# Patient Record
(demographics unavailable — no encounter records)

---

## 2024-11-13 NOTE — REASON FOR VISIT
[Follow-Up: _____] : a [unfilled] follow-up visit [FreeTextEntry1] : Dos: 6/11/24; S/P: Bilateral subtotal subcutaneous mastectomy with nipple transposition on a pedicle.

## 2024-11-13 NOTE — PHYSICAL EXAM
[de-identified] : All incisions healing appropriately. Intact nipple sensation per pt. Appropriate fullness; no fluctuance. Mild standing cutaneous deformity at lateral aspect of left incision, approximately 4 cm in length

## 2024-11-13 NOTE — HISTORY OF PRESENT ILLNESS
[FreeTextEntry1] : The patient returns about 5 months following double incision top surgery with nipple transposition on a pedicle. Denies any significant issues.

## 2024-11-13 NOTE — ASSESSMENT
[FreeTextEntry1] : Patient is interested in removal of left excess skin at end of the incision in the office. Surgical coordinator will reach out to schedule the procedure.  I, Dr. Carmen, personally performed the evaluation and management (E/M) services for this established patient who presents today with continued gender dysphoria. That E/M includes conducting the clinically appropriate interval history &/or exam, assessing all new/exacerbated conditions, and establishing a new plan of care. Today, my ROBBIN, Cuco Guerin PA-C, was here to observe my evaluation and management service for this continued condition and follow the plan of care established by me going forward.

## 2025-02-05 NOTE — ASSESSMENT
[FreeTextEntry1] : Wound care and activity restrictions reviewed. Return in 1-2 weeks for reassessment.

## 2025-02-05 NOTE — PROCEDURE
[Nl] : Minimal [FreeTextEntry1] : left chest standing cutaneous deformity [FreeTextEntry2] : excision and complex repair left chest 7 cm [FreeTextEntry6] : Risks, benefits, and alternatives to excision of the mass were discussed with the patient. Specific risks of bleeding, infection, hematoma, seroma, recurrence, damage to nearby structures, permanent scarring, chronic pain, and need for additional procedures, among other risks, were discussed the patient and all questions were answered. The patient consented to the procedure.  The left chest was prepped with povidine iodine and field block performed with 1% lidocaine with epinephrine buffered 5:1 with bicarbonate 8.6%. A total of 20 cc were infiltrated. After anesthesia of the area was confirmed, a lentiform incision was made around the deformity. Sharp and electrocautery dissection was performed to free the deformity from the surrounding tissue.  The wound was irrigated, closed in layers, and steri strips and a sterile dressing applied. Total length of excision and closure was 7 cm. [FreeTextEntry7] : None

## 2025-02-10 NOTE — PHYSICAL EXAM
[de-identified] : Incisions intact. No significant areas of fullness, fluctuance, erythema, or ecchymosis.

## 2025-02-10 NOTE — REASON FOR VISIT
[Post Op: _________] : a [unfilled] post op visit [FreeTextEntry1] : Dos: 02/05/2025; S/P:  Excision of left chest excess skin

## 2025-02-10 NOTE — HISTORY OF PRESENT ILLNESS
[FreeTextEntry1] : Patient returns 7 days following excision of left chest excess skin in office. Denies any significant issues.

## 2025-02-13 NOTE — ASSESSMENT
[FreeTextEntry1] : No evidence of infection or collection. Wound care, activity restrictions were reviewed. Follow-up as needed.

## 2025-02-13 NOTE — HISTORY OF PRESENT ILLNESS
[FreeTextEntry1] : Patient returns 8 days following excision of left chest excess skin in office. Denies any significant issues.

## 2025-02-13 NOTE — PHYSICAL EXAM
[de-identified] : Incisions intact. Mild ecchymosis noted below incision. No significant areas of fullness, fluctuance, or erythema.

## 2025-02-13 NOTE — PHYSICAL EXAM
[de-identified] : Incisions intact. Mild ecchymosis noted below incision. No significant areas of fullness, fluctuance, or erythema.